# Patient Record
Sex: FEMALE | URBAN - METROPOLITAN AREA
[De-identification: names, ages, dates, MRNs, and addresses within clinical notes are randomized per-mention and may not be internally consistent; named-entity substitution may affect disease eponyms.]

---

## 2024-07-24 ENCOUNTER — OFFICE VISIT (OUTPATIENT)
Dept: URBAN - METROPOLITAN AREA CLINIC 48 | Facility: CLINIC | Age: 44
End: 2024-07-24

## 2024-08-19 ENCOUNTER — DASHBOARD ENCOUNTERS (OUTPATIENT)
Age: 44
End: 2024-08-19

## 2024-08-20 ENCOUNTER — OFFICE VISIT (OUTPATIENT)
Dept: URBAN - METROPOLITAN AREA CLINIC 48 | Facility: CLINIC | Age: 44
End: 2024-08-20

## 2024-08-20 RX ORDER — NAPROXEN SODIUM 500 MG/1
1 TABLET WITH FOOD OR MILK AS NEEDED TABLET ORAL
Qty: 14 TABLET | Status: ACTIVE | COMMUNITY

## 2024-08-20 RX ORDER — SUCRALFATE 1 G/1
1 TABLET ON AN EMPTY STOMACH TABLET ORAL TWICE A DAY
Qty: 20 TABLET | Refills: 0 | Status: ACTIVE | COMMUNITY

## 2024-08-20 RX ORDER — FAMOTIDINE 40 MG/1
1 TABLET TABLET, FILM COATED ORAL ONCE A DAY
Qty: 30 TABLET | Refills: 0 | Status: ACTIVE | COMMUNITY

## 2024-08-20 RX ORDER — ONDANSETRON 4 MG/1
1 TABLET ON THE TONGUE AND ALLOW TO DISSOLVE TABLET, ORALLY DISINTEGRATING ORAL ONCE A DAY
Qty: 4 TABLET | Status: ACTIVE | COMMUNITY

## 2024-08-20 RX ORDER — HYDROCODONE BITARTRATE AND ACETAMINOPHEN 5; 325 MG/1; MG/1
1 TABLET AS NEEDED TABLET ORAL
Qty: 16 TABLET | Refills: 0 | Status: ACTIVE | COMMUNITY

## 2024-09-11 ENCOUNTER — OFFICE VISIT (OUTPATIENT)
Dept: URBAN - METROPOLITAN AREA CLINIC 48 | Facility: CLINIC | Age: 44
End: 2024-09-11

## 2024-09-11 RX ORDER — FAMOTIDINE 40 MG/1
1 TABLET TABLET, FILM COATED ORAL ONCE A DAY
Qty: 30 TABLET | Refills: 0 | Status: ACTIVE | COMMUNITY

## 2024-09-11 RX ORDER — ONDANSETRON 4 MG/1
1 TABLET ON THE TONGUE AND ALLOW TO DISSOLVE TABLET, ORALLY DISINTEGRATING ORAL ONCE A DAY
Qty: 4 TABLET | Status: ACTIVE | COMMUNITY

## 2024-09-11 RX ORDER — SUCRALFATE 1 G/1
1 TABLET ON AN EMPTY STOMACH TABLET ORAL TWICE A DAY
Qty: 20 TABLET | Refills: 0 | Status: ACTIVE | COMMUNITY

## 2024-09-11 RX ORDER — NAPROXEN SODIUM 500 MG/1
1 TABLET WITH FOOD OR MILK AS NEEDED TABLET ORAL
Qty: 14 TABLET | Status: ACTIVE | COMMUNITY

## 2024-09-11 RX ORDER — HYDROCODONE BITARTRATE AND ACETAMINOPHEN 5; 325 MG/1; MG/1
1 TABLET AS NEEDED TABLET ORAL
Qty: 16 TABLET | Refills: 0 | Status: ACTIVE | COMMUNITY

## 2024-09-11 NOTE — HPI-TODAY'S VISIT:
3-year-old female presents for posterior Per PCP note 7/10/2024, patient has high cardiac risk for elective surgery. Labs 8/8/2024 show hemoglobin 11.1, normal MCV.  Hemoglobin was 12.4 in July.  Iron studies 7/5/2024 with elevated TIBC 453, normal iron, percent sat, ferritin.